# Patient Record
Sex: MALE | Race: BLACK OR AFRICAN AMERICAN | Employment: UNEMPLOYED | URBAN - METROPOLITAN AREA
[De-identification: names, ages, dates, MRNs, and addresses within clinical notes are randomized per-mention and may not be internally consistent; named-entity substitution may affect disease eponyms.]

---

## 2017-02-14 ENCOUNTER — HOSPITAL ENCOUNTER (EMERGENCY)
Age: 37
Discharge: HOME | End: 2017-02-14
Attending: EMERGENCY MEDICINE
Payer: SELF-PAY

## 2017-02-14 VITALS
RESPIRATION RATE: 17 BRPM | HEART RATE: 74 BPM | WEIGHT: 265 LBS | BODY MASS INDEX: 37.1 KG/M2 | DIASTOLIC BLOOD PRESSURE: 76 MMHG | SYSTOLIC BLOOD PRESSURE: 142 MMHG | HEIGHT: 71 IN | OXYGEN SATURATION: 98 % | TEMPERATURE: 98 F

## 2017-02-14 DIAGNOSIS — T14.8XXA MUSCLE STRAIN: Primary | ICD-10-CM

## 2017-02-14 PROCEDURE — 99283 EMERGENCY DEPT VISIT LOW MDM: CPT | Performed by: PHYSICIAN ASSISTANT

## 2017-02-14 RX ORDER — CYCLOBENZAPRINE HCL 5 MG
5 TABLET ORAL 2 TIMES DAILY
Qty: 14 TAB | Refills: 0 | Status: SHIPPED | OUTPATIENT
Start: 2017-02-14 | End: 2017-02-21

## 2017-02-14 RX ORDER — IBUPROFEN 800 MG/1
800 TABLET ORAL
Qty: 30 TAB | Refills: 0 | Status: SHIPPED | OUTPATIENT
Start: 2017-02-14 | End: 2017-02-24

## 2017-02-14 NOTE — DISCHARGE INSTRUCTIONS
Muscle Strain: Care Instructions  Your Care Instructions  A muscle strain happens when you overstretch, or pull, a muscle. It can happen when you exercise or lift something or when you have an accident. Rest and other home care can help the muscle heal.  Follow-up care is a key part of your treatment and safety. Be sure to make and go to all appointments, and call your doctor if you are having problems. Its also a good idea to know your test results and keep a list of the medicines you take. How can you care for yourself at home? · Rest the strained muscle. Do not put weight on it for a day or two. If your doctor advises you to, use crutches or a sling to rest a sore limb. · Put ice or a cold pack on the sore muscle for 10 to 20 minutes at a time to stop swelling. Put a thin cloth between the ice pack and your skin. · Prop up the sore arm or leg on a pillow when you ice it or anytime you sit or lie down during the next 3 days. Try to keep it above the level of your heart. This will help reduce swelling. · Take pain medicines exactly as directed. ¨ If the doctor gave you a prescription medicine for pain, take it as prescribed. ¨ If you are not taking a prescription pain medicine, ask your doctor if you can take an over-the-counter medicine. · Do not do anything that makes the pain worse. Return to exercise gradually as you feel better. When should you call for help? Call your doctor now or seek immediate medical care if:  · You have new severe pain. · Your injured limb is cool or pale or changes color. · You have tingling, weakness, or numbness in your injured limb. · You cannot move the injured area. Watch closely for changes in your health, and be sure to contact your doctor if:  · You cannot put weight on a joint, or it feels unsteady when you walk. · Pain and swelling get worse or do not start to get better after 2 days of home treatment. Where can you learn more?   Go to http://bryon-jessie.info/. Enter C812 in the search box to learn more about \"Muscle Strain: Care Instructions. \"  Current as of: May 23, 2016  Content Version: 11.1  © 3276-1952 [employer]. Care instructions adapted under license by [employer] (which disclaims liability or warranty for this information). If you have questions about a medical condition or this instruction, always ask your healthcare professional. [place] [address] any warranty or liability for your use of this information.

## 2017-02-14 NOTE — ED PROVIDER NOTES
HPI Comments: Pt unsure of actual injury but feels back started to hurt after work yesterday, worse this am, no meds taken for pain     Patient is a 31 y.o. male presenting with back pain. Back Pain    This is a new problem. The current episode started yesterday. The problem has been gradually worsening. The problem occurs constantly. Patient reports work related injury. The pain is associated with lifting. The pain is present in the thoracic spine and right side. The quality of the pain is described as aching. The pain does not radiate. The pain is at a severity of 8/10. The pain is mild. The symptoms are aggravated by certain positions. Pertinent negatives include no tingling and no weakness. He has tried nothing for the symptoms. The treatment provided no relief. No past medical history on file. Past Surgical History:   Procedure Laterality Date    Hx appendectomy           No family history on file. Social History     Social History    Marital status: SINGLE     Spouse name: N/A    Number of children: N/A    Years of education: N/A     Occupational History    Not on file. Social History Main Topics    Smoking status: Current Every Day Smoker     Packs/day: 1.00    Smokeless tobacco: Not on file    Alcohol use Yes      Comment: occ    Drug use: Yes     Special: Marijuana    Sexual activity: Not on file     Other Topics Concern    Not on file     Social History Narrative    No narrative on file         ALLERGIES: Review of patient's allergies indicates no known allergies. Review of Systems   Musculoskeletal: Positive for back pain. Neurological: Negative for tingling and weakness. All other systems reviewed and are negative. Vitals:    02/14/17 1012   BP: 145/79   Pulse: 90   Resp: 18   Temp: 97.9 °F (36.6 °C)   SpO2: 98%   Weight: 120.2 kg (265 lb)   Height: 5' 11\" (1.803 m)            Physical Exam   Constitutional: He is oriented to person, place, and time.  He appears well-developed and well-nourished. No distress. HENT:   Head: Normocephalic and atraumatic. Eyes: Conjunctivae and EOM are normal. Pupils are equal, round, and reactive to light. Neck: Normal range of motion. Neck supple. Cardiovascular: Normal rate and regular rhythm. Pulmonary/Chest: Effort normal and breath sounds normal. No respiratory distress. He has no wheezes. Abdominal: Soft. Bowel sounds are normal.   Musculoskeletal: He exhibits tenderness. He exhibits no edema. Back:    Pain to palpation rt latissimus area to palpation, no skin changes     Neurological: He is alert and oriented to person, place, and time. Skin: Skin is warm. Nursing note and vitals reviewed.        MDM  Number of Diagnoses or Management Options  Diagnosis management comments: Muscle strain, will give motrin and flexeril, stressed moist heat        Amount and/or Complexity of Data Reviewed  Review and summarize past medical records: yes    Risk of Complications, Morbidity, and/or Mortality  Presenting problems: low  Diagnostic procedures: low  Management options: low    Patient Progress  Patient progress: improved    ED Course       Procedures

## 2024-10-17 ENCOUNTER — OFFICE VISIT (OUTPATIENT)
Dept: VASCULAR SURGERY | Age: 44
End: 2024-10-17
Payer: COMMERCIAL

## 2024-10-17 VITALS
SYSTOLIC BLOOD PRESSURE: 157 MMHG | WEIGHT: 266 LBS | OXYGEN SATURATION: 95 % | HEIGHT: 71 IN | DIASTOLIC BLOOD PRESSURE: 104 MMHG | BODY MASS INDEX: 37.24 KG/M2 | HEART RATE: 72 BPM

## 2024-10-17 DIAGNOSIS — K55.069 MESENTERIC VEIN THROMBOSIS (HCC): Primary | ICD-10-CM

## 2024-10-17 PROCEDURE — 99204 OFFICE O/P NEW MOD 45 MIN: CPT | Performed by: STUDENT IN AN ORGANIZED HEALTH CARE EDUCATION/TRAINING PROGRAM

## 2024-10-17 RX ORDER — CHOLECALCIFEROL (VITAMIN D3) 1250 MCG
CAPSULE ORAL WEEKLY
COMMUNITY

## 2024-10-17 RX ORDER — ENOXAPARIN SODIUM 150 MG/ML
1 INJECTION SUBCUTANEOUS EVERY 12 HOURS
COMMUNITY

## 2024-10-17 NOTE — PROGRESS NOTES
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 male presents recent hospitalization for abdominal pain with evidence of ileocolic venous thrombosis with extension of the SMV and portal vein.  Patient discharged on Lovenox injections through December.  Patient reports resolution abdominal pain without any recurrence.  Duplex ultrasound today with patent SMV and portal veins without thrombosis.    Plan:   Ileocolic thrombus: Provoked venous thrombus given recent COVID-19 infection.  Recommend continued Lovenox injections for 90 days, repeat CT abdomen pelvis with contrast with venous phase early December prior to completion of Lovenox injections.     Imaging interrupted:   Mesenteric DUS   The abdominal aorta and iliac arteries appear patent and of normal diameter. There is no evidence of mesenteric stenosis or venous thrombus in the abdominal vasculature.     Physical Examination:   Height: 1.803 m (5' 11\"), Weight - Scale: 120.7 kg (266 lb), BP: (!) 157/104    General: No acute distress  HENT: AT  CV: Regular rhythm.    LUNG: No respiratory distress on RA  Abdominal: No distension.  Extremities: No wounds or edema, motor and sensation grossly intact      Past Medical History:   Diagnosis Date    DM (diabetes mellitus) (HCC)     Vitamin deficiency        Current Outpatient Medications   Medication Sig Dispense Refill    enoxaparin (LOVENOX) 120 MG/0.8ML injection Inject 1 mg/kg into the skin in the morning and 1 mg/kg in the evening.      metFORMIN (GLUCOPHAGE) 500 MG tablet Take 1 tablet by mouth daily (with breakfast)      Cholecalciferol (VITAMIN D3) 1.25 MG (82811 UT) CAPS Take by mouth once a week       No current facility-administered medications for this visit.       No past surgical history on file.    Metal

## 2024-10-17 NOTE — PROGRESS NOTES
VASCULAR SURGERY   [address] [place] [address] [address] [address]  [phone] FAX: [phone]    Dear Employer,    Patient, Nick Ba  1980, is cleared to return to work with full duty as of 10/17/24. Please contact office at, [phone], if you have concerns or questions.     Thank you,   Dr. Michael Bang

## 2024-11-26 ENCOUNTER — TELEPHONE (OUTPATIENT)
Dept: VASCULAR SURGERY | Age: 44
End: 2024-11-26

## 2024-11-26 DIAGNOSIS — K55.069 MESENTERIC VEIN THROMBOSIS (HCC): Primary | ICD-10-CM

## 2024-11-26 NOTE — TELEPHONE ENCOUNTER
Informed patient that his 12/2 appointment will be cancelled since the CTA test has not been completed.  Provided phone number for Radiology and let him know that test needs to be done prior to his finishing the Lovenox injections.  Patient states he will call Radiology to schedule and then call us back to get on Dr Bang's schedule.

## 2024-11-27 ENCOUNTER — TELEPHONE (OUTPATIENT)
Dept: VASCULAR SURGERY | Age: 44
End: 2024-11-27

## 2024-11-27 NOTE — TELEPHONE ENCOUNTER
Left message on patient voicemail asking him to return call to schedule appointment with Dr Bang to review his CTA.

## 2024-12-04 ENCOUNTER — HOSPITAL ENCOUNTER (OUTPATIENT)
Dept: CT IMAGING | Age: 44
Discharge: HOME | End: 2024-12-07
Payer: COMMERCIAL

## 2024-12-04 DIAGNOSIS — K55.069 MESENTERIC VEIN THROMBOSIS (HCC): ICD-10-CM

## 2024-12-04 LAB — CREAT BLD-MCNC: 1.21 MG/DL (ref 0.8–1.5)

## 2024-12-04 PROCEDURE — 82565 ASSAY OF CREATININE: CPT

## 2024-12-04 PROCEDURE — 74174 CTA ABD&PLVS W/CONTRAST: CPT | Performed by: RADIOLOGY

## 2024-12-04 PROCEDURE — 6360000004 HC RX CONTRAST MEDICATION: Performed by: NURSE PRACTITIONER

## 2024-12-04 PROCEDURE — 74174 CTA ABD&PLVS W/CONTRAST: CPT

## 2024-12-04 RX ORDER — IOPAMIDOL 755 MG/ML
100 INJECTION, SOLUTION INTRAVASCULAR
Status: COMPLETED | OUTPATIENT
Start: 2024-12-04 | End: 2024-12-04

## 2024-12-04 RX ADMIN — IOPAMIDOL 100 ML: 755 INJECTION, SOLUTION INTRAVENOUS at 07:56

## 2025-01-27 ENCOUNTER — OFFICE VISIT (OUTPATIENT)
Dept: VASCULAR SURGERY | Age: 45
End: 2025-01-27
Payer: COMMERCIAL

## 2025-01-27 VITALS
WEIGHT: 273 LBS | HEART RATE: 85 BPM | SYSTOLIC BLOOD PRESSURE: 134 MMHG | DIASTOLIC BLOOD PRESSURE: 83 MMHG | OXYGEN SATURATION: 93 % | HEIGHT: 71 IN | BODY MASS INDEX: 38.22 KG/M2

## 2025-01-27 DIAGNOSIS — K55.069 MESENTERIC VEIN THROMBOSIS (HCC): Primary | ICD-10-CM

## 2025-01-27 PROCEDURE — 99212 OFFICE O/P EST SF 10 MIN: CPT | Performed by: STUDENT IN AN ORGANIZED HEALTH CARE EDUCATION/TRAINING PROGRAM

## 2025-01-27 NOTE — PROGRESS NOTES
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 male presents presents with previous ileocolic venous thrombosis with extension of the SMV and portal vein.  Completely 90 days of Lovenox. Repeat CTV with non-occlusive thrombus in the SMV without extension into portal vein and widely patent SMV branches. He has been off AC for 2 months. Patient denies further episodes of abdominal pain.      Plan:   Non-occlusive SMV thrombus: Discussed treatment with AC vs ASA alone. If recurrence of abdominal pain will present to the ER and will then need life-long AC. 81mg ASA daily. F/u PRN    Imaging interrupted:   CTV abd/pelvis:   IMPRESSION:  1. Probable partially occlusive thrombus of the SMV. Otherwise, the mesenteric  veins appear patent. The portal vein is patent.    Physical Examination:   Height: 1.803 m (5' 11\"), Weight - Scale: 123.8 kg (273 lb), BP: 134/83    General: No acute distress  HENT: AT  CV: Regular rhythm.    LUNG: No respiratory distress on RA  Abdominal: No distension.  Extremities: No wounds or edema, motor and sensation grossly intact      Past Medical History:   Diagnosis Date    DM (diabetes mellitus) (HCC)     Vitamin deficiency        Current Outpatient Medications   Medication Sig Dispense Refill    enoxaparin (LOVENOX) 120 MG/0.8ML injection Inject 1 mg/kg into the skin in the morning and 1 mg/kg in the evening.      metFORMIN (GLUCOPHAGE) 500 MG tablet Take 1 tablet by mouth daily (with breakfast)      Cholecalciferol (VITAMIN D3) 1.25 MG (34617 UT) CAPS Take by mouth once a week       No current facility-administered medications for this visit.       No past surgical history on file.    Metal implants or AICD: no    Dye allergy: no     Smoker:  Tobacco Use      Smoking status: Every Day